# Patient Record
Sex: FEMALE | Race: BLACK OR AFRICAN AMERICAN | Employment: UNEMPLOYED | URBAN - METROPOLITAN AREA
[De-identification: names, ages, dates, MRNs, and addresses within clinical notes are randomized per-mention and may not be internally consistent; named-entity substitution may affect disease eponyms.]

---

## 2017-04-20 ENCOUNTER — HOSPITAL ENCOUNTER (OUTPATIENT)
Dept: MAMMOGRAPHY | Age: 62
Discharge: HOME | End: 2017-04-20
Attending: FAMILY MEDICINE
Payer: MEDICARE

## 2017-04-20 DIAGNOSIS — Z12.31 VISIT FOR SCREENING MAMMOGRAM: ICD-10-CM

## 2017-04-20 PROCEDURE — 77063 BREAST TOMOSYNTHESIS BI: CPT

## 2017-05-17 ENCOUNTER — HOSPITAL ENCOUNTER (OUTPATIENT)
Dept: MAMMOGRAPHY | Age: 62
Discharge: HOME | End: 2017-05-17
Attending: FAMILY MEDICINE
Payer: MEDICARE

## 2017-05-17 ENCOUNTER — HOSPITAL ENCOUNTER (OUTPATIENT)
Dept: ULTRASOUND IMAGING | Age: 62
Discharge: HOME | End: 2017-05-17
Attending: FAMILY MEDICINE
Payer: MEDICARE

## 2017-05-17 DIAGNOSIS — R92.8 ABNORMAL MAMMOGRAM: ICD-10-CM

## 2017-05-17 PROCEDURE — 76642 ULTRASOUND BREAST LIMITED: CPT

## 2017-05-17 PROCEDURE — 77065 DX MAMMO INCL CAD UNI: CPT

## 2017-12-27 ENCOUNTER — HOSPITAL ENCOUNTER (EMERGENCY)
Age: 62
Discharge: HOME | End: 2017-12-27
Attending: EMERGENCY MEDICINE
Payer: MEDICARE

## 2017-12-27 ENCOUNTER — APPOINTMENT (OUTPATIENT)
Dept: GENERAL RADIOLOGY | Age: 62
End: 2017-12-27
Attending: PHYSICIAN ASSISTANT
Payer: MEDICARE

## 2017-12-27 VITALS
SYSTOLIC BLOOD PRESSURE: 141 MMHG | HEART RATE: 49 BPM | DIASTOLIC BLOOD PRESSURE: 73 MMHG | RESPIRATION RATE: 16 BRPM | BODY MASS INDEX: 27.66 KG/M2 | HEIGHT: 64 IN | WEIGHT: 162 LBS | TEMPERATURE: 98.9 F | OXYGEN SATURATION: 99 %

## 2017-12-27 DIAGNOSIS — M25.561 ACUTE PAIN OF RIGHT KNEE: ICD-10-CM

## 2017-12-27 DIAGNOSIS — S82.831A CLOSED FRACTURE OF DISTAL END OF RIGHT FIBULA, UNSPECIFIED FRACTURE MORPHOLOGY, INITIAL ENCOUNTER: Primary | ICD-10-CM

## 2017-12-27 PROCEDURE — 73564 X-RAY EXAM KNEE 4 OR MORE: CPT

## 2017-12-27 PROCEDURE — 73610 X-RAY EXAM OF ANKLE: CPT

## 2017-12-27 PROCEDURE — 75810000053 HC SPLINT APPLICATION

## 2017-12-27 PROCEDURE — 99284 EMERGENCY DEPT VISIT MOD MDM: CPT

## 2017-12-27 PROCEDURE — 74011250637 HC RX REV CODE- 250/637: Performed by: PHYSICIAN ASSISTANT

## 2017-12-27 RX ORDER — HYDROCODONE BITARTRATE AND ACETAMINOPHEN 5; 325 MG/1; MG/1
1 TABLET ORAL
Qty: 20 TAB | Refills: 0 | Status: SHIPPED | OUTPATIENT
Start: 2017-12-27

## 2017-12-27 RX ORDER — HYDROCODONE BITARTRATE AND ACETAMINOPHEN 5; 325 MG/1; MG/1
1 TABLET ORAL
Status: COMPLETED | OUTPATIENT
Start: 2017-12-27 | End: 2017-12-27

## 2017-12-27 RX ORDER — DIPHENHYDRAMINE HCL 25 MG
25 CAPSULE ORAL
Status: COMPLETED | OUTPATIENT
Start: 2017-12-27 | End: 2017-12-27

## 2017-12-27 RX ORDER — DIPHENHYDRAMINE HCL 25 MG
25 CAPSULE ORAL
Qty: 20 CAP | Refills: 0 | Status: SHIPPED | OUTPATIENT
Start: 2017-12-27 | End: 2018-01-06

## 2017-12-27 RX ORDER — IBUPROFEN 600 MG/1
600 TABLET ORAL
Status: COMPLETED | OUTPATIENT
Start: 2017-12-27 | End: 2017-12-27

## 2017-12-27 RX ADMIN — DIPHENHYDRAMINE HYDROCHLORIDE 25 MG: 25 CAPSULE ORAL at 07:40

## 2017-12-27 RX ADMIN — HYDROCODONE BITARTRATE AND ACETAMINOPHEN 1 TABLET: 5; 325 TABLET ORAL at 07:40

## 2017-12-27 RX ADMIN — IBUPROFEN 600 MG: 600 TABLET ORAL at 07:19

## 2017-12-27 NOTE — ED PROVIDER NOTES
HPI Comments: Kenneth Montes is a 57 y.o. female that presents to the ED with a complaint of right knee and ankle pain after a mechanical fall, slipping down 3 steps yesterday. Pt states that she was able to ambulate with pain after the fall but statest hat pain became unbearable last night and was unable to sleep. She states that she did not take any OTC medications for her pain. She rates that pain as a 9/10 and throbbing. Patient is a 57 y.o. female presenting with ankle pain, knee pain, and fall.    Ankle Pain      Knee Pain      Fall          Past Medical History:   Diagnosis Date    Anemia NEC     Anxiety     Asthma     Cataract     Chronic pain     back    Depression     Diabetes ([address]     External hemorrhoid     Fibromyalgia     GERD (gastroesophageal reflux disease)     H/O sickle cell trait     Hypercholesterolemia     Hypertension     Osteoarthritis     hands, knees    Stenosis     spinal, age 50's       Past Surgical History:   Procedure Laterality Date    HX BACK SURGERY  12/28/2007    anterior cervical diskectomy with decompression of cervical canal, C4-5, and C5-6    HX BREAST BIOPSY Left 11/5/2014    LEFT BREAST BIOPSY WITH NEEDLE LOCALIZATION MAMMOGRAM TIMES TWO performed by Javy Conner MD at [address] [place] [address] HX BREAST REDUCTION  1992    HX CATARACT REMOVAL  2011    HX [facility]    HX HYSTERECTOMY  1991    fibroids    HX LUMBAR FUSION  2012         Family History:   Problem Relation Age of Onset   [address] [facility] [address] Arthritis-osteo Mother     Diabetes Mother     Stroke Mother     Hypertension Mother     Liver Disease Father     Liver Disease Sister        Social History     Social History    Marital status:      Spouse name: N/A    Number of children: N/A    Years of education: N/A     Occupational History    disablity, chronic back pain      Social History Main Topics    Smoking status: Current Some Day Smoker     Packs/day: 1.00     Years: 10.00 Types: Cigarettes    Smokeless tobacco: Never Used    Alcohol use 0.0 oz/week     0 Standard drinks or equivalent per week      Comment: socially    Drug use: No    Sexual activity: No     Other Topics Concern    Not on file     Social History Narrative         ALLERGIES: Dilaudid [hydromorphone (bulk)]; Sulfa (sulfonamide antibiotics); and Vicodin [hydrocodone-acetaminophen]    Review of Systems    Vitals:    12/27/17 0609   BP: 141/73   Pulse: (!) 49   Resp: 16   Temp: 98.9 °F (37.2 °C)   SpO2: 99%   Weight: 73.5 kg (162 lb)   Height: 5' 4\" (1.626 m)            Physical Exam   Constitutional: She is oriented to person, place, and time. She appears well-developed and well-nourished. No distress. HENT:   Head: Normocephalic and atraumatic. Nose: Nose normal.   Eyes: Conjunctivae are normal.   Neck: Normal range of motion. Cardiovascular: Regular rhythm and normal heart sounds. Bradycardia present. Pulmonary/Chest: Effort normal and breath sounds normal. No respiratory distress. She has no wheezes. Musculoskeletal:        Right knee: She exhibits decreased range of motion. She exhibits no swelling, no effusion, no ecchymosis, no deformity, no laceration, no erythema, normal alignment, no LCL laxity, no bony tenderness, normal meniscus and no MCL laxity. Tenderness found. Right ankle: She exhibits decreased range of motion, swelling and ecchymosis. Tenderness. Lateral malleolus, CF ligament and posterior TFL tenderness found. Feet:    Swelling noted to lateral malleolus  Ecchymosis noted to lateral malleolus and foot     Neurological: She is alert and oriented to person, place, and time. Psychiatric: She has a normal mood and affect.         MDM  Number of Diagnoses or Management Options  Acute pain of right knee:   Closed fracture of distal end of right fibula, unspecified fracture morphology, initial encounter:   Diagnosis management comments: XR Results (most recent):  Results from Hospital Encounter encounter on 12/27/17    XR KNEE RT MIN 4 V    Narrative  Right Knee Four Views    CPT CODE: 87298    HISTORY: Severe right knee pain and right ankle pain status post fall down 3  steps to a prior to arrival, progressively worsening. COMPARISON: None. FINDINGS:    Four views obtained. There is no evidence of fracture or dislocation. The joint  spaces are maintained. Mineralization is normal. Small joint effusion is  demonstrated. Impression  IMPRESSION:    Small joint effusion. Right Ankle Three Views     CPT CODE: [id number]     HISTORY: Severe right knee pain and right ankle pain status post fall down 3  steps to a prior to arrival, progressively worsening, fall, swelling .     COMPARISON: None.     FINDINGS:      Three views have been obtained. There is moderate lateral soft tissue swelling. There is minimal transverse offset of an oblique fracture through the distal  fibular diaphysis. The joint spaces are maintained. Mineralization is normal.   There is no radiopaque foreign body. Small calcaneal spurs noted.     IMPRESSION  IMPRESSION:     Minimally offset distal fibular fracture. Pt initially declines Opoid pain medications stating that she does not like to take medications. Discussed fracture with pt and she agrees to stronger pain medications and she will have her neighbor come pick her up as she drove here today. Pt is unsure of she has taken Norco before. Chart review shows allergy to Vicodin. Will treat with Benadryl if rash develops. NVI following application of splint. Will discharge home at this time.     Impression: distal tibia fracture    Plan: splint and crutches  discharge home  Pain control  Follow up with Ortho        Amount and/or Complexity of Data Reviewed  Tests in the radiology section of CPT®: ordered and reviewed    Risk of Complications, Morbidity, and/or Mortality  Presenting problems: moderate  Diagnostic procedures: moderate  Management options: moderate    Patient Progress  Patient progress: stable    ED Course       Procedures           Vitals:  Patient Vitals for the past 12 hrs:   Temp Pulse Resp BP SpO2   12/27/17 0609 98.9 °F (37.2 °C) (!) 49 16 141/73 99 %         Medications ordered:   Medications   ibuprofen (MOTRIN) tablet 600 mg (600 mg Oral Given 12/27/17 0719)   HYDROcodone-acetaminophen (NORCO) 5-325 mg per tablet 1 Tab (1 Tab Oral Given 12/27/17 0740)   diphenhydrAMINE (BENADRYL) capsule 25 mg (25 mg Oral Given 12/27/17 0740)         Lab findings:  No results found for this or any previous visit (from the past 12 hour(s)). X-Ray, CT or other radiology findings or impressions:  XR ANKLE RT MIN 3 V   Final Result      XR KNEE RT MIN 4 V   Final Result          Progress notes, Consult notes or additional Procedure notes:       Disposition:  Diagnosis:   1. Closed fracture of distal end of right fibula, unspecified fracture morphology, initial encounter        Disposition: discharge    Follow-up Information     None           Patient's Medications   Start Taking    DIPHENHYDRAMINE (BENADRYL) 25 MG CAPSULE    Take 1 Cap by mouth every six (6) hours as needed for up to 10 days. HYDROCODONE-ACETAMINOPHEN (NORCO) 5-325 MG PER TABLET    Take 1 Tab by mouth every four (4) hours as needed for Pain. Max Daily Amount: 6 Tabs. Continue Taking    ALBUTEROL (PROVENTIL HFA, VENTOLIN HFA, PROAIR HFA) 90 MCG/ACTUATION INHALER    Take 2 Puffs by inhalation every six (6) hours as needed. ALCOHOL SWABS (ALCOHOL PREP PADS) PADM    Use for daily blood sugar testing to clean skin. NOT FOR INGESTION. ALENDRONATE (FOSAMAX) 35 MG TABLET    TAKE 1 TABLET BY MOUTH EVERY 7 DAYS    AMLODIPINE (NORVASC) 10 MG TABLET    Take 1 Tab by mouth daily. ATENOLOL (TENORMIN) 100 MG TABLET    Take 1 Tab by mouth daily. ATORVASTATIN (LIPITOR) 20 MG TABLET    Take 1 Tab by mouth daily.     CARBAMIDE PEROXIDE (DEBROX) 6.5 % OTIC SOLUTION    Administer 5 Drops into each ear two (2) times a day. CHOLECALCIFEROL (VITAMIN D3) 1,000 UNIT TABLET    Take 1 Tab by mouth daily. CITALOPRAM (CELEXA) 20 MG TABLET    Take 1 Tab by mouth daily. CLONAZEPAM (KLONOPIN) 1 MG TABLET    Take 1 Tab by mouth daily as needed (anxiety). CYCLOBENZAPRINE (FLEXERIL) 10 MG TABLET    Take 1 Tab by mouth nightly. FLUCONAZOLE (DIFLUCAN) 150 MG TABLET    Take 150 mg by mouth daily. FLUTICASONE (FLONASE) 50 MCG/ACTUATION NASAL SPRAY    1 spray each nostril BID    GLIPIZIDE (GLUCOTROL) 10 MG TABLET    Take 1 tablet by mouth two (2) times a day. GLUCOSE BLOOD VI TEST STRIPS (BLOOD GLUCOSE TEST) STRIP    Use as directed to check blood sugar twice a day. HYDROCHLOROTHIAZIDE (HYDRODIURIL) 12.5 MG TABLET    Take 1 Tab by mouth daily. JANUVIA 100 MG TABLET    TAKE 1 TABLET BY MOUTH DAILY    LISINOPRIL (PRINIVIL, ZESTRIL) 10 MG TABLET    TAKE 1 TABLET BY MOUTH DAILY    LYRICA 100 MG CAPSULE    TAKE ONE CAPSULE BY MOUTH DAILY    MELOXICAM (MOBIC) 15 MG TABLET    Take 1 tablet by mouth daily. MULTIVITAMIN (ONE A DAY) TABLET    Take 1 tablet by mouth daily. NYSTATIN (MYCOSTATIN) POWDER    Apply  to affected area two (2) times a day. OMEPRAZOLE (PRILOSEC) 40 MG CAPSULE    Take 40 mg by mouth daily as needed. POTASSIUM CHLORIDE (K-DUR, KLOR-CON) 20 MEQ TABLET    Take 1 Tab by mouth two (2) times a day.    These Medications have changed    No medications on file   Stop Taking    No medications on file

## 2017-12-27 NOTE — ED NOTES
Huma Rosales placed short cast on patient lower leg. I have given crutches to the patient, adjusted them and provided complete instructions on safe use.

## 2017-12-27 NOTE — ED TRIAGE NOTES
Pt states she slipped coming down the stairs in her house, pt right knee was bent under her causing her ankle to extend out. Pt c/o 9/10 aching pain.

## 2017-12-29 ENCOUNTER — OFFICE VISIT (OUTPATIENT)
Dept: ORTHOPEDIC SURGERY | Facility: CLINIC | Age: 62
End: 2017-12-29

## 2017-12-29 VITALS
OXYGEN SATURATION: 95 % | SYSTOLIC BLOOD PRESSURE: 157 MMHG | TEMPERATURE: 99.1 F | HEIGHT: 64 IN | HEART RATE: 63 BPM | DIASTOLIC BLOOD PRESSURE: 91 MMHG | RESPIRATION RATE: 18 BRPM

## 2017-12-29 DIAGNOSIS — M25.571 ACUTE RIGHT ANKLE PAIN: ICD-10-CM

## 2017-12-29 DIAGNOSIS — S82.831A CLOSED FRACTURE OF DISTAL END OF RIGHT FIBULA, UNSPECIFIED FRACTURE MORPHOLOGY, INITIAL ENCOUNTER: Primary | ICD-10-CM

## 2017-12-29 PROBLEM — F33.9 RECURRENT DEPRESSION (HCC): Status: ACTIVE | Noted: 2017-12-29

## 2017-12-29 RX ORDER — OXYCODONE AND ACETAMINOPHEN 7.5; 325 MG/1; MG/1
1 TABLET ORAL
Qty: 25 TAB | Refills: 0 | Status: SHIPPED | OUTPATIENT
Start: 2017-12-29 | End: 2018-01-09 | Stop reason: SDUPTHER

## 2017-12-29 NOTE — PATIENT INSTRUCTIONS
Broken Ankle: Care Instructions  Your Care Instructions    An ankle may break (fracture) during sports, a fall, or other accidents. Fractures can range from a small, hairline crack, to a bone or bones broken into two or more pieces. Your treatment depends on how bad the break is. Your doctor may have put your ankle in a splint or cast to allow it to heal or to keep it stable until you see another doctor. It may take weeks or months for your ankle to heal. You can help your ankle heal with some care at home. You heal best when you take good care of yourself. Eat a variety of healthy foods, and don't smoke. You may have had a sedative to help you relax. You may be unsteady after having sedation. It can take a few hours for the medicine's effects to wear off. Common side effects of sedation include nausea, vomiting, and feeling sleepy or tired. The doctor has checked you carefully, but problems can develop later. If you notice any problems or new symptoms,  get medical treatment right away. Follow-up care is a key part of your treatment and safety. Be sure to make and go to all appointments, and call your doctor if you are having problems. It's also a good idea to know your test results and keep a list of the medicines you take. How can you care for yourself at home? · If the doctor gave you a sedative:  ¨ For 24 hours, don't do anything that requires attention to detail. It takes time for the medicine's effects to completely wear off. ¨ For your safety, do not drive or operate any machinery that could be dangerous. Wait until the medicine wears off and you can think clearly and react easily. · Put ice or a cold pack on your ankle for 10 to 20 minutes at a time. Try to do this every 1 to 2 hours for the next 3 days (when you are awake). Put a thin cloth between the ice and your cast or splint. Keep your cast or splint dry. · Follow the cast care instructions your doctor gives you.  If you have a splint, do not take it off unless your doctor tells you to. · Be safe with medicines. Take pain medicines exactly as directed. ¨ If the doctor gave you a prescription medicine for pain, take it as prescribed. ¨ If you are not taking a prescription pain medicine, ask your doctor if you can take an over-the-counter medicine. · Prop up your leg on pillows in the first few days after the injury. Keep the ankle higher than the level of your heart. This will help reduce swelling. · Do not put weight on your ankle unless your doctor tells you to. Use crutches to walk. · Follow instructions for exercises to keep your leg strong. · Wiggle your toes often to reduce swelling and stiffness. When should you call for help? Call 911 anytime you think you may need emergency care. For example, call if:  ? · You have chest pain, are short of breath, or you cough up blood. ? · You are very sleepy and you have trouble waking up. ?Call your doctor now or seek immediate medical care if:  ? · You have new or worse nausea or vomiting. ? · You have new or worse pain. ? · Your foot is cool or pale or changes color. ? · You have tingling, weakness, or numbness in your toes. ? · Your cast or splint feels too tight. ? · You have signs of a blood clot in your leg (called a deep vein thrombosis), such as:  ¨ Pain in your calf, back of the knee, thigh, or groin. ¨ Redness or swelling in your leg. ? Watch closely for changes in your health, and be sure to contact your doctor if:  ? · You have a problem with your splint or cast.   ? · You do not get better as expected. Where can you learn more? Go to http://elisa-lydia.info/. Enter P763 in the search box to learn more about \"Broken Ankle: Care Instructions. \"  Current as of: March 21, 2017  Content Version: 11.4  © 7559-5196 [employer].  Care instructions adapted under license by [employer] (which disclaims liability or warranty for this information). If you have questions about a medical condition or this instruction, always ask your healthcare professional. [address] any warranty or liability for your use of this information. Wearing a Cast: Care Instructions  Your Care Instructions  A cast protects a broken bone or other injury. Most casts are made of fiberglass, but plaster casts are still sometimes used. Once a cast is on, you can't remove it yourself. Your doctor will take it off. Follow-up care is a key part of your treatment and safety. Be sure to make and go to all appointments, and call your doctor if you are having problems. It's also a good idea to know your test results and keep a list of the medicines you take. How can you care for yourself at home? General care  · Follow your doctor's instructions for when you can first put weight on the cast. Fiberglass casts dry quickly and are soon ready to bear weight. But plaster casts may take several days before they are hard enough to use. When it's okay to put weight on your cast, do not stand or walk on it unless it is designed for walking. · Prop up the injured arm or leg on a pillow anytime you sit or lie down during the next 3 days. Try to keep it above the level of your heart. This will help reduce swelling. · If the fingers or toes on the limb with the cast were not injured, wiggle them every now and then. This helps move the blood and fluids in the injured limb. · Be safe with medicines. Read and follow all instructions on the label. ¨ If the doctor gave you a prescription medicine for pain, take it as prescribed. ¨ If you are not taking a prescription pain medicine, ask your doctor if you can take an over-the-counter medicine. · Keep up your muscle strength and tone as much as you can while protecting your injured limb or joint.  Your doctor may want you to tense and relax the muscles protected by the cast. Check with your doctor or your physical or occupational therapist for instructions. Water and your cast  · Keep your cast dry. · Tape a sheet of plastic to cover your cast when you take a shower or bath or when you have any other contact with water. Moisture can collect under the cast and cause skin irritation and itching. It can make infection more likely if you have had surgery or have a wound under the cast.  Cast and skin care  · Try blowing cool air from a hair dryer or fan into the cast to help relieve itching. Never stick items under your cast to scratch the skin. · Don't use oils or lotions near your cast. If the skin gets red or irritated around the edge of the cast, you may pad the edges with a soft material or use tape to cover them. When should you call for help? Call your doctor now or seek immediate medical care if:  ? · You have increased or severe pain. ? · You feel a warm or painful spot under the cast.   ? · You have problems with your cast. For example:  ¨ The skin under the cast burns or stings. ¨ The cast feels too tight. ¨ There is a lot of swelling near the cast. (Some swelling is normal.)  ¨ You have a new fever. ¨ There is drainage or a bad smell coming from the cast.   ? · Your foot or hand is cool or pale or changes color. ? · You have trouble moving your fingers or toes. ? · You have symptoms of a blood clot in your arm or leg (called a deep vein thrombosis). These may include:  ¨ Pain in the arm, calf, back of the knee, thigh, or groin. ¨ Redness and swelling in the arm, leg, or groin. ? Watch closely for changes in your health, and be sure to contact your doctor if:  ? · The cast is breaking apart. ? · You are not getting better as expected. Where can you learn more? Go to http://elisa-lydia.info/. Enter 454 [id number] in the search box to learn more about \"Wearing a Cast: Care Instructions. \"  Current as of: March 21, 2017  Content Version: 11.4  © 9043-4512 [employer]. Care instructions adapted under license by [employer] (which disclaims liability or warranty for this information). If you have questions about a medical condition or this instruction, always ask your healthcare professional. [address] any warranty or liability for your use of this information.

## 2017-12-29 NOTE — MR AVS SNAPSHOT
Visit Information Date & Time Provider Department Dept. Phone Encounter #  
 12/29/2017  2:15 PM Lupe Byrne, 25 [facility] - [facility] [id number] Follow-up Instructions Return in about 1 week (around 1/5/2018). Follow-up and Disposition History Upcoming Health Maintenance Date Due Pneumococcal 19-64 Medium Risk (1 of 1 - PPSV23) 9/26/1972 ZOSTER VACCINE AGE 60> 7/26/2013 EYE EXAM RETINAL OR DILATED Q1 7/14/2016 HEMOGLOBIN A1C Q6M 10/18/2016 MICROALBUMIN Q1 4/18/2017 LIPID PANEL Q1 4/18/2017 FOOT EXAM Q1 5/5/2017 BREAST CANCER SCRN MAMMOGRAM 5/17/2018 COLONOSCOPY 4/16/2024 DTaP/Tdap/Td series (2 - Td) 11/12/2024 Allergies as of 12/29/2017  Review Complete On: 12/29/2017 By: Lupe Byrne MD  
  
 Severity Noted Reaction Type Reactions Dilaudid [Hydromorphone (Bulk)]  11/12/2014    Rash, Nausea and Vomiting  
 Sulfa (Sulfonamide Antibiotics)  11/05/2014    Rash Vicodin [Hydrocodone-acetaminophen]  08/07/2013    Rash Other reaction(s): unknown Current Immunizations  Never Reviewed Name Date Pneumococcal Conjugate (PCV-13) 5/5/2016 Tdap 11/12/2014 Not reviewed this visit You Were Diagnosed With   
  
 Codes Comments Closed fracture of distal end of right fibula, unspecified fracture morphology, initial encounter    -  Primary ICD-10-CM: E17.387H ICD-9-CM: 824.8 Acute right ankle pain     ICD-10-CM: M25.571 ICD-9-CM: 719.47, 338.19 Vitals BP Pulse Temp Resp Height(growth percentile) SpO2  
 (!) 157/91 63 99.1 °F (37.3 °C) (Oral) 18 5' 4\" (1.626 m) 95% OB Status Smoking Status Hysterectomy Current Some Day Smoker Preferred Pharmacy Pharmacy Name Phone [facility] [address] [phone] Your Updated Medication List  
  
   
 This list is accurate as of: 12/29/17  4:47 PM.  Always use your most recent med list.  
  
  
  
  
 albuterol 90 mcg/actuation inhaler Commonly known as:  PROVENTIL HFA, VENTOLIN HFA, PROAIR HFA Take 2 Puffs by inhalation every six (6) hours as needed. alcohol swabs Padm Commonly known as:  ALCOHOL PREP PADS Use for daily blood sugar testing to clean skin. NOT FOR INGESTION. alendronate 35 mg tablet Commonly known as:  FOSAMAX TAKE 1 TABLET BY MOUTH EVERY 7 DAYS  
  
 amLODIPine 10 mg tablet Commonly known as:  Voncile [place] Take 1 Tab by mouth daily. atenolol 100 mg tablet Commonly known as:  TENORMIN Take 1 Tab by mouth daily. atorvastatin 20 mg tablet Commonly known as:  LIPITOR Take 1 Tab by mouth daily. carbamide peroxide 6.5 % otic solution Commonly known as:  Radha Horse Administer 5 Drops into each ear two (2) times a day. cholecalciferol 1,000 unit tablet Commonly known as:  VITAMIN D3 Take 1 Tab by mouth daily. citalopram 20 mg tablet Commonly known as:  Janny Bongo Take 1 Tab by mouth daily. clonazePAM 1 mg tablet Commonly known as:  [place] Take 1 Tab by mouth daily as needed (anxiety). cyclobenzaprine 10 mg tablet Commonly known as:  FLEXERIL Take 1 Tab by mouth nightly. diphenhydrAMINE 25 mg capsule Commonly known as:  BENADRYL Take 1 Cap by mouth every six (6) hours as needed for up to 10 days. fluconazole 150 mg tablet Commonly known as:  DIFLUCAN Take 150 mg by mouth daily. fluticasone 50 mcg/actuation nasal spray Commonly known as:  FLONASE  
1 spray each nostril BID  
  
 glipiZIDE 10 mg tablet Commonly known as:  Carly Garb Take 1 tablet by mouth two (2) times a day. glucose blood VI test strips strip Commonly known as:  blood glucose test  
Use as directed to check blood sugar twice a day. hydroCHLOROthiazide 12.5 mg tablet Commonly known as:  HYDRODIURIL  
 Take 1 Tab by mouth daily. HYDROcodone-acetaminophen 5-325 mg per tablet Commonly known as:  Sydney Kind Take 1 Tab by mouth every four (4) hours as needed for Pain. Max Daily Amount: 6 Tabs. JANUVIA 100 mg tablet Generic drug:  SITagliptin TAKE 1 TABLET BY MOUTH DAILY  
  
 lisinopril 10 mg tablet Commonly known as:  PRINIVIL, ZESTRIL  
TAKE 1 TABLET BY MOUTH DAILY  
  
 LYRICA 100 mg capsule Generic drug:  pregabalin TAKE ONE CAPSULE BY MOUTH DAILY  
  
 meloxicam 15 mg tablet Commonly known as:  MOBIC Take 1 tablet by mouth daily. multivitamin tablet Commonly known as:  ONE A DAY Take 1 tablet by mouth daily. nystatin powder Commonly known as:  MYCOSTATIN Apply  to affected area two (2) times a day. oxyCODONE-acetaminophen 7.5-325 mg per tablet Commonly known as:  PERCOCET Take 1 Tab by mouth every six (6) hours as needed for Pain. Max Daily Amount: 4 Tabs. potassium chloride 20 mEq tablet Commonly known as:  K-DUR, KLOR-CON Take 1 Tab by mouth two (2) times a day. PriLOSEC 40 mg capsule Generic drug:  omeprazole Take 40 mg by mouth daily as needed. Prescriptions Printed Refills  
 oxyCODONE-acetaminophen (PERCOCET) 7.5-325 mg per tablet 0 Sig: Take 1 Tab by mouth every six (6) hours as needed for Pain. Max Daily Amount: 4 Tabs. Class: Print Route: Oral  
  
We Performed the Following AMB POC XRAY, ANKLE; COMPLETE, 3+ VIE [08937 CPT(R)] CAST SUP SHRT LEG FIBERGLASS [id number] [facility]] POST OP SHOE [XAH538 Custom] Comments:  
 RIGHT POST OP SHOE  
 OK CLOSED RX DIST FIBULA FX [id number] CPT(R)] [address] Comments:  
 Ambulation with walker non weightbearing Follow-up Instructions Return in about 1 week (around 1/5/2018). Referral Information Referral ID Referred By Referred To  
  
 [id number] Michaelle Gilford C Not Available Visits Status Start Date End Date 1 New Request 12/29/17 12/29/18 If your referral has a status of pending review or denied, additional information will be sent to support the outcome of this decision. Patient Instructions Broken Ankle: Care Instructions Your Care Instructions An ankle may break (fracture) during sports, a fall, or other accidents. Fractures can range from a small, hairline crack, to a bone or bones broken into two or more pieces. Your treatment depends on how bad the break is. Your doctor may have put your ankle in a splint or cast to allow it to heal or to keep it stable until you see another doctor. It may take weeks or months for your ankle to heal. You can help your ankle heal with some care at home. You heal best when you take good care of yourself. Eat a variety of healthy foods, and don't smoke. You may have had a sedative to help you relax. You may be unsteady after having sedation. It can take a few hours for the medicine's effects to wear off. Common side effects of sedation include nausea, vomiting, and feeling sleepy or tired. The doctor has checked you carefully, but problems can develop later. If you notice any problems or new symptoms,  get medical treatment right away. Follow-up care is a key part of your treatment and safety. Be sure to make and go to all appointments, and call your doctor if you are having problems. It's also a good idea to know your test results and keep a list of the medicines you take. How can you care for yourself at home? · If the doctor gave you a sedative: ¨ For 24 hours, don't do anything that requires attention to detail. It takes time for the medicine's effects to completely wear off. ¨ For your safety, do not drive or operate any machinery that could be dangerous. Wait until the medicine wears off and you can think clearly and react easily. · Put ice or a cold pack on your ankle for 10 to 20 minutes at a time.  Try to do this every 1 to 2 hours for the next 3 days (when you are awake). Put a thin cloth between the ice and your cast or splint. Keep your cast or splint dry. · Follow the cast care instructions your doctor gives you. If you have a splint, do not take it off unless your doctor tells you to. · Be safe with medicines. Take pain medicines exactly as directed. ¨ If the doctor gave you a prescription medicine for pain, take it as prescribed. ¨ If you are not taking a prescription pain medicine, ask your doctor if you can take an over-the-counter medicine. · Prop up your leg on pillows in the first few days after the injury. Keep the ankle higher than the level of your heart. This will help reduce swelling. · Do not put weight on your ankle unless your doctor tells you to. Use crutches to walk. · Follow instructions for exercises to keep your leg strong. · Wiggle your toes often to reduce swelling and stiffness. When should you call for help? Call 911 anytime you think you may need emergency care. For example, call if: 
? · You have chest pain, are short of breath, or you cough up blood. ? · You are very sleepy and you have trouble waking up. ?Call your doctor now or seek immediate medical care if: 
? · You have new or worse nausea or vomiting. ? · You have new or worse pain. ? · Your foot is cool or pale or changes color. ? · You have tingling, weakness, or numbness in your toes. ? · Your cast or splint feels too tight. ? · You have signs of a blood clot in your leg (called a deep vein thrombosis), such as: 
¨ Pain in your calf, back of the knee, thigh, or groin. ¨ Redness or swelling in your leg. ? Watch closely for changes in your health, and be sure to contact your doctor if: 
? · You have a problem with your splint or cast.  
? · You do not get better as expected. Where can you learn more? Go to http://elisa-lydia.info/. Enter P763 in the search box to learn more about \"Broken Ankle: Care Instructions. \" Current as of: March 21, 2017 Content Version: 11.4 © 3497-0667 [employer]. Care instructions adapted under license by [employer] (which disclaims liability or warranty for this information). If you have questions about a medical condition or this instruction, always ask your healthcare professional. [address] any warranty or liability for your use of this information. Wearing a Cast: Care Instructions Your Care Instructions A cast protects a broken bone or other injury. Most casts are made of fiberglass, but plaster casts are still sometimes used. Once a cast is on, you can't remove it yourself. Your doctor will take it off. Follow-up care is a key part of your treatment and safety. Be sure to make and go to all appointments, and call your doctor if you are having problems. It's also a good idea to know your test results and keep a list of the medicines you take. How can you care for yourself at home? General care · Follow your doctor's instructions for when you can first put weight on the cast. Fiberglass casts dry quickly and are soon ready to bear weight. But plaster casts may take several days before they are hard enough to use. When it's okay to put weight on your cast, do not stand or walk on it unless it is designed for walking. · Prop up the injured arm or leg on a pillow anytime you sit or lie down during the next 3 days. Try to keep it above the level of your heart. This will help reduce swelling. · If the fingers or toes on the limb with the cast were not injured, wiggle them every now and then. This helps move the blood and fluids in the injured limb. · Be safe with medicines. Read and follow all instructions on the label. ¨ If the doctor gave you a prescription medicine for pain, take it as prescribed. ¨ If you are not taking a prescription pain medicine, ask your doctor if you can take an over-the-counter medicine. · Keep up your muscle strength and tone as much as you can while protecting your injured limb or joint. Your doctor may want you to tense and relax the muscles protected by the cast. Check with your doctor or your physical or occupational therapist for instructions. Water and your cast 
· Keep your cast dry. · Tape a sheet of plastic to cover your cast when you take a shower or bath or when you have any other contact with water. Moisture can collect under the cast and cause skin irritation and itching. It can make infection more likely if you have had surgery or have a wound under the cast. 
Cast and skin care · Try blowing cool air from a hair dryer or fan into the cast to help relieve itching. Never stick items under your cast to scratch the skin. · Don't use oils or lotions near your cast. If the skin gets red or irritated around the edge of the cast, you may pad the edges with a soft material or use tape to cover them. When should you call for help? Call your doctor now or seek immediate medical care if: 
? · You have increased or severe pain. ? · You feel a warm or painful spot under the cast.  
? · You have problems with your cast. For example: ¨ The skin under the cast burns or stings. ¨ The cast feels too tight. ¨ There is a lot of swelling near the cast. (Some swelling is normal.) ¨ You have a new fever. ¨ There is drainage or a bad smell coming from the cast.  
? · Your foot or hand is cool or pale or changes color. ? · You have trouble moving your fingers or toes. ? · You have symptoms of a blood clot in your arm or leg (called a deep vein thrombosis). These may include: 
¨ Pain in the arm, calf, back of the knee, thigh, or groin. ¨ Redness and swelling in the arm, leg, or groin. ? Watch closely for changes in your health, and be sure to contact your doctor if: ? · The cast is breaking apart. ? · You are not getting better as expected. Where can you learn more? Go to http://elisa-lydia.info/. Enter 454 [id number] in the search box to learn more about \"Wearing a Cast: Care Instructions. \" Current as of: March 21, 2017 Content Version: 11.4 © 3249-0340 [employer]. Care instructions adapted under license by [employer] (which disclaims liability or warranty for this information). If you have questions about a medical condition or this instruction, always ask your healthcare professional. [address] any warranty or liability for your use of this information. Patient Instructions History Introducing \[facility]\"" & HEALTH SERVICES! Nba Espinoza introduces [employer] patient portal. Now you can access parts of your medical record, email your doctor's office, and request medication refills online. 1. In your internet browser, go to [email or web address] Textbook Time. [email or web address] Textbook Time 2. Click on the First Time User? Click Here link in the Sign In box. You will see the New Member Sign Up page. 3. Enter your [employer] Access Code exactly as it appears below. You will not need to use this code after youve completed the sign-up process. If you do not sign up before the expiration date, you must request a new code. · [employer] Access Code: [email or web address] Expires: 3/27/2018  8:21 AM 
 
4. Enter the last four digits of your Social Security Number (xxxx) and Date of Birth (mm/dd/yyyy) as indicated and click Submit. You will be taken to the next sign-up page. 5. Create a CloudBiltt ID. This will be your [employer] login ID and cannot be changed, so think of one that is secure and easy to remember. 6. Create a [employer] password. You can change your password at any time. 7. Enter your Password Reset Question and Answer. This can be used at a later time if you forget your password. 8. Enter your e-mail address. You will receive e-mail notification when new information is available in [address] 9. Click Sign Up. You can now view and download portions of your medical record. 10. Click the Download Summary menu link to download a portable copy of your medical information. If you have questions, please visit the Frequently Asked Questions section of the motify website. Remember, motify is NOT to be used for urgent needs. For medical emergencies, dial 911. Now available from your iPhone and Android! Please provide this summary of care documentation to your next provider. Your primary care clinician is listed as Don Osborn. If you have any questions after today's visit, please call [phone].

## 2017-12-29 NOTE — PROGRESS NOTES
Patient: Dagoberto Moses                MRN: [id number]       SSN: [id number]  YOB: 1955        AGE: 57 y.o. SEX: female    PCP: Cee Barrios MD  12/29/17    Chief Complaint   Patient presents with    Leg Pain     Right     HISTORY:  Dagoberto Moses is a 57 y.o. female who is seen for a right ankle injury. She reports she slipped on the third step while walking down stairs in her bedroom slippers and fell down twisted her right leg. She was seen at [facility] ED on 12/27/17 where x-rays revealed a minimally displaced distal fibula fracture. She is adamantly opposed to surgery. She states that she has not been able to walk on her ankle since the injury. She was previously seen for bilateral knee and left great toe pain. She was treated by Dr. Anuel Knott at the spine center for her low back pain. She is s/p lumbar fusion by Dr. Malgorzata Blackwood. Pain Assessment  12/29/2017   Location of Pain Leg   Location Modifiers Right   Severity of Pain 8   Quality of Pain Throbbing;Aching   Duration of Pain Persistent   Frequency of Pain Constant   Aggravating Factors Bending;Walking;Standing   Limiting Behavior Yes   Relieving Factors Elevation; Rest   Result of Injury Yes   Work-Related Injury No   Type of Injury Fall     Occupation, etc:  Ms. Omaira Griffin receives social security disability benefits for lumbar stenosis and fibromyalgia. 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 She states she is very active in the community and various community organizations. She lives in [place] with her granddaughter who comes and goes. She has a 23 month grandson that she has joint custody with the father. Current weight is 162 pounds. She is 5'4\" tall. She is a diet controlled diabetic and hypertensive.      Lab Results   Component Value Date/Time    Hemoglobin A1c 6.7 04/18/2016 09:34 AM    Hemoglobin A1c (POC) 7.5 08/27/2015 12:14 PM     Weight Metrics 12/27/2017 5/5/2016 3/14/2016 2/29/2016 1/4/2016 12/1/2015 11/12/2015   Weight 162 lb 176 lb 181 lb 3.2 oz 178 lb 174 lb 176 lb 172 lb   BMI 27.81 kg/m2 30.2 kg/m2 31.09 kg/m2 30.54 kg/m2 29.85 kg/m2 30.2 kg/m2 29.51 kg/m2       Patient Active Problem List   Diagnosis Code    Chronic back pain M54.9, G89.29    Chronic neck pain M54.2, G89.29    Stenosis     Depression F32.9    Osteopenia M85.80    Dehydration E86.0    Hypokalemia E87.6    Hypomagnesemia E83.42    Vitamin D deficiency E55.9    Essential hypertension I10    Well controlled type 2 diabetes mellitus (HCC) E11.9    Recurrent depression ([facility] [address] F33.9     REVIEW OF SYSTEMS: All Below are Negative except: See HPI   Constitutional: negative for fever, chills, and weight loss. Cardiovascular: negative for chest pain, claudication, leg swelling, SOB, OBANDO   Gastrointestinal: Negative for pain, N/V/C/D, Blood in stool or urine, dysuria,  hematuria, incontinence, pelvic pain. Musculoskeletal: See HPI   Neurological: Negative for dizziness and weakness. Negative for headaches, Visual changes, confusion, seizures   Phychiatric/Behavioral: Negative for depression, memory loss, substance  abuse. Extremities: Negative for hair changes, rash, or skin lesion changes. Hematologic: Negative for bleeding problems, bruising, pallor or swollen lymph  nodes   Peripheral Vascular: No calf pain, no circulation deficits.     Social History     Social History    Marital status:      Spouse name: N/A    Number of children: N/A    Years of education: N/A     Occupational History    disablity, chronic back pain      Social History Main Topics    Smoking status: Current Some Day Smoker     Packs/day: 1.00     Years: 10.00     Types: Cigarettes    Smokeless tobacco: Never Used    Alcohol use 0.0 oz/week     0 Standard drinks or equivalent per week      Comment: socially    Drug use: No    Sexual activity: No     Other Topics Concern    Not on file     Social History Narrative      Allergies   Allergen Reactions    Dilaudid [Hydromorphone (Bulk)] Rash and Nausea and Vomiting    Sulfa (Sulfonamide Antibiotics) Rash    Vicodin [Hydrocodone-Acetaminophen] Rash     Other reaction(s): unknown      Current Outpatient Prescriptions   Medication Sig    LYRICA 100 mg capsule TAKE ONE CAPSULE BY MOUTH DAILY    glucose blood VI test strips (BLOOD GLUCOSE TEST) strip Use as directed to check blood sugar twice a day.  atorvastatin (LIPITOR) 20 mg tablet Take 1 Tab by mouth daily.  citalopram (CELEXA) 20 mg tablet Take 1 Tab by mouth daily.  amLODIPine (NORVASC) 10 mg tablet Take 1 Tab by mouth daily.  lisinopril (PRINIVIL, ZESTRIL) 10 mg tablet TAKE 1 TABLET BY MOUTH DAILY    fluconazole (DIFLUCAN) 150 mg tablet Take 150 mg by mouth daily.  cholecalciferol (VITAMIN D3) 1,000 unit tablet Take 1 Tab by mouth daily.  nystatin (MYCOSTATIN) powder Apply  to affected area two (2) times a day.  Hydrochlorothiazide (HYDRODIURIL) 12.5 mg tablet Take 1 Tab by mouth daily.  atenolol (TENORMIN) 100 mg tablet Take 1 Tab by mouth daily.  alcohol swabs (ALCOHOL PREP PADS) padm Use for daily blood sugar testing to clean skin. NOT FOR INGESTION.  albuterol (PROVENTIL HFA, VENTOLIN HFA, PROAIR HFA) 90 mcg/actuation inhaler Take 2 Puffs by inhalation every six (6) hours as needed.  clonazePAM (KLONOPIN) 1 mg tablet Take 1 Tab by mouth daily as needed (anxiety).  meloxicam (MOBIC) 15 mg tablet Take 1 tablet by mouth daily.  multivitamin (ONE A DAY) tablet Take 1 tablet by mouth daily.  glipiZIDE (GLUCOTROL) 10 mg tablet Take 1 tablet by mouth two (2) times a day.  omeprazole (PRILOSEC) 40 mg capsule Take 40 mg by mouth daily as needed.  fluticasone (FLONASE) 50 mcg/actuation nasal spray 1 spray each nostril BID    potassium chloride (K-DUR, KLOR-CON) 20 mEq tablet Take 1 Tab by mouth two (2) times a day.     HYDROcodone-acetaminophen (Lenon Gauze) 5-325 mg per tablet Take 1 Tab by mouth every four (4) hours as needed for Pain. Max Daily Amount: 6 Tabs.  diphenhydrAMINE (BENADRYL) 25 mg capsule Take 1 Cap by mouth every six (6) hours as needed for up to 10 days.  JANUVIA 100 mg tablet TAKE 1 TABLET BY MOUTH DAILY    alendronate (FOSAMAX) 35 mg tablet TAKE 1 TABLET BY MOUTH EVERY 7 DAYS    carbamide peroxide (DEBROX) 6.5 % otic solution Administer 5 Drops into each ear two (2) times a day.  cyclobenzaprine (FLEXERIL) 10 mg tablet Take 1 Tab by mouth nightly. No current facility-administered medications for this visit. PHYSICAL EXAMINATION:  Visit Vitals    BP (!) 157/91    Pulse 63    Temp 99.1 °F (37.3 °C) (Oral)    Resp 18    Ht 5' 4\" (1.626 m)    SpO2 95%      ORTHO EXAMINATION:  Examination Right Ankle/Foot Left Ankle/Foot   Skin Impending fracture blister over the anteromedial aspect and lateral aspect Intact   Swelling +++ medial and lateral swelling -   Dorsiflexion 0 10   Plantarflexion 20 25   Deformity - -   Inversion laxity - -   Anterior drawer - -   Medial tenderness  -   Lateral tenderness + -   Heel cord Intact Intact   Sensation Intact Intact   Bunion - -   Toe nails Normal Normal   Capillary refill Normal Normal   Ambulating using a walker today  Purplish discoloration of the toes. 4+ pre-tibial edema   Home Care Face to Face Encounter    Patients Name: Rich Monique    YOB: 1955    Primary Diagnosis:   Chief Complaint   Patient presents with    Leg Pain     Right       Date of Face to Face:   12/29/17                                Face to Face Encounter findings are related to primary reason for home care:   yes    1. I certify that the patient needs intermittent care as follows: skilled nursing care    2.  I certify that this patient is homebound, that is: 1) patient requires the use of a cane/crutches/walker/wheelchair device, special transportation, or assistance of another to leave the home; or 2) patient's condition makes leaving the home medically contraindicated; and 3) patient has a normal inability to leave the home and leaving the home requires considerable and taxing effort. Patient may leave the home for infrequent and short duration for medical reasons, and occasional absences for non-medical reasons. Homebound status is due to the following functional limitations: Patient's ambulation limited secondary to severe pain and requires the use of an assistive device and the assistance of a caregiver for safe completion. Patient with strength and ROM deficits limiting ambulation endurance requiring the use of an assistive device and the assistance of a caregiver. Patient deemed temporarily homebound secondary to increased risk for infection when leaving home and going out into the community. 3. I certify that this patient is under my care and that I, or a nurse practitioner or  [id number], or clinical nurse specialist, or certified nurse midwife, working with me, had a Face-to-Face Encounter that meets the physician Face-to-Face Encounter requirements. The following are the clinical findings from the [address] [place] [address] encounter that support the need for skilled services and is a summary of the encounter:      ICD-10-CM ICD-9-CM    1. Closed fracture of distal end of right fibula, unspecified fracture morphology, initial encounter S82.831A 824.8 CAST SUP SHRT LEG FIBERGLASS      POST OP SHOE      REFERRAL TO HOME HEALTH      ME CLOSED RX DIST FIBULA FX   2. Acute right ankle pain M25.571 719.47 REFERRAL TO HOME HEALTH     338.19        See attached progress note. Hans Rincon  12/29/17    THE FOLLOWING TO BE COMPLETED BY THE COMMUNITY PHYSICIAN:    I concur with the findings described above from the F2F encounter that this patient is homebound and in need of a skilled service.     Printed Certifying Physician Name: Hans Rincon    Date: 12/29/17    RADIOGRAPHS:  XR RIGHT ANKLE POST REDUCTION 12/29/17  IMPRESSION:  Improved position s/p closed reduction and SLC application    XR RIGHT ANKLE 12/27/17  IMPRESSION:  Minimally offset distal fibular fracture.  -I have independently reviewed these images during this office visit. -Dr. Demarco Neither  Mild medial joint space narrowing    XR RIGHT KNEE 12/27/17  IMPRESSION:   Small joint effusion.   -I have independently reviewed these images during this office visit. -Dr. Rios Oh:      ICD-10-CM ICD-9-CM    1. Closed fracture of distal end of right fibula, unspecified fracture morphology, initial encounter S82.831A 824.8 CAST SUP SHRT LEG FIBERGLASS      POST OP SHOE      REFERRAL TO HOME HEALTH      NE CLOSED RX DIST FIBULA FX   2. Acute right ankle pain M25.571 719.47 REFERRAL TO HOME HEALTH     338.19      PLAN:  We discussed surgical and non-surgical options today. She is adamantly opposed to surgery. Molded short leg cast applied after closed manipulation. Post reduction x rays look good. She will follow up in 1 week. Home therapy order placed today for non weight bearing ambulation instructions with a walker. Percocet Rx provided.     Scribed by Tali Mathesw ([address]) as dictated by Juan Antonio Murguia MD

## 2017-12-30 ENCOUNTER — HOME HEALTH ADMISSION (OUTPATIENT)
Dept: HOME HEALTH SERVICES | Facility: HOME HEALTH | Age: 62
End: 2017-12-30
Payer: MEDICARE

## 2017-12-30 PROCEDURE — 3331090002 HH PPS REVENUE DEBIT

## 2017-12-30 PROCEDURE — 3331090001 HH PPS REVENUE CREDIT

## 2017-12-31 ENCOUNTER — HOME CARE VISIT (OUTPATIENT)
Dept: HOME HEALTH SERVICES | Facility: HOME HEALTH | Age: 62
End: 2017-12-31
Payer: MEDICARE

## 2017-12-31 ENCOUNTER — HOME CARE VISIT (OUTPATIENT)
Dept: SCHEDULING | Facility: HOME HEALTH | Age: 62
End: 2017-12-31
Payer: MEDICARE

## 2017-12-31 PROCEDURE — 3331090003 HH PPS REVENUE ADJ

## 2017-12-31 PROCEDURE — 3331090001 HH PPS REVENUE CREDIT

## 2017-12-31 PROCEDURE — 400013 HH SOC

## 2017-12-31 PROCEDURE — G0151 HHCP-SERV OF PT,EA 15 MIN: HCPCS

## 2017-12-31 PROCEDURE — 3331090002 HH PPS REVENUE DEBIT

## 2018-01-01 PROCEDURE — 3331090002 HH PPS REVENUE DEBIT

## 2018-01-01 PROCEDURE — 3331090001 HH PPS REVENUE CREDIT

## 2018-01-02 ENCOUNTER — HOME CARE VISIT (OUTPATIENT)
Dept: HOME HEALTH SERVICES | Facility: HOME HEALTH | Age: 63
End: 2018-01-02
Payer: MEDICARE

## 2018-01-02 ENCOUNTER — TELEPHONE (OUTPATIENT)
Dept: ORTHOPEDIC SURGERY | Age: 63
End: 2018-01-02

## 2018-01-02 VITALS
TEMPERATURE: 97.8 F | HEART RATE: 74 BPM | SYSTOLIC BLOOD PRESSURE: 110 MMHG | DIASTOLIC BLOOD PRESSURE: 80 MMHG | RESPIRATION RATE: 17 BRPM

## 2018-01-02 PROCEDURE — 3331090001 HH PPS REVENUE CREDIT

## 2018-01-02 PROCEDURE — 3331090002 HH PPS REVENUE DEBIT

## 2018-01-02 NOTE — TELEPHONE ENCOUNTER
[facility] Therapist was to see patient today for physical therapy. Fede Chatterjee has been unable to reach patient by phone. Fede Chatterjee did drive to patient home, but patient is not a home. Fede Chatterjee will try to call tomorrow to reach patient and come back to house.  Patient has only had the initial eval. Ms. Kayla Rabago can be reached at [phone]

## 2018-01-03 PROCEDURE — 3331090002 HH PPS REVENUE DEBIT

## 2018-01-03 PROCEDURE — 3331090001 HH PPS REVENUE CREDIT

## 2018-01-04 PROCEDURE — 3331090001 HH PPS REVENUE CREDIT

## 2018-01-04 PROCEDURE — 3331090002 HH PPS REVENUE DEBIT

## 2018-01-05 ENCOUNTER — HOME CARE VISIT (OUTPATIENT)
Dept: HOME HEALTH SERVICES | Facility: HOME HEALTH | Age: 63
End: 2018-01-05
Payer: MEDICARE

## 2018-01-05 PROCEDURE — 3331090001 HH PPS REVENUE CREDIT

## 2018-01-05 PROCEDURE — 3331090002 HH PPS REVENUE DEBIT

## 2018-01-06 PROCEDURE — 3331090001 HH PPS REVENUE CREDIT

## 2018-01-06 PROCEDURE — 3331090002 HH PPS REVENUE DEBIT

## 2018-01-07 PROCEDURE — 3331090001 HH PPS REVENUE CREDIT

## 2018-01-07 PROCEDURE — 3331090002 HH PPS REVENUE DEBIT

## 2018-01-08 PROCEDURE — 3331090001 HH PPS REVENUE CREDIT

## 2018-01-08 PROCEDURE — 3331090002 HH PPS REVENUE DEBIT

## 2018-01-09 ENCOUNTER — OFFICE VISIT (OUTPATIENT)
Dept: ORTHOPEDIC SURGERY | Facility: CLINIC | Age: 63
End: 2018-01-09

## 2018-01-09 VITALS
HEART RATE: 47 BPM | TEMPERATURE: 98.2 F | RESPIRATION RATE: 18 BRPM | DIASTOLIC BLOOD PRESSURE: 93 MMHG | HEIGHT: 64 IN | SYSTOLIC BLOOD PRESSURE: 156 MMHG | OXYGEN SATURATION: 99 %

## 2018-01-09 DIAGNOSIS — S82.821D CLOSED TORUS FRACTURE OF DISTAL END OF RIGHT FIBULA WITH ROUTINE HEALING, SUBSEQUENT ENCOUNTER: ICD-10-CM

## 2018-01-09 DIAGNOSIS — M25.571 ACUTE RIGHT ANKLE PAIN: ICD-10-CM

## 2018-01-09 DIAGNOSIS — S82.831A CLOSED FRACTURE OF DISTAL END OF RIGHT FIBULA, UNSPECIFIED FRACTURE MORPHOLOGY, INITIAL ENCOUNTER: ICD-10-CM

## 2018-01-09 DIAGNOSIS — M25.571 RIGHT ANKLE PAIN, UNSPECIFIED CHRONICITY: Primary | ICD-10-CM

## 2018-01-09 PROBLEM — E11.40 TYPE 2 DIABETES MELLITUS WITH DIABETIC NEUROPATHY (HCC): Status: ACTIVE | Noted: 2018-01-09

## 2018-01-09 PROCEDURE — 3331090001 HH PPS REVENUE CREDIT

## 2018-01-09 PROCEDURE — 3331090002 HH PPS REVENUE DEBIT

## 2018-01-09 RX ORDER — OXYCODONE AND ACETAMINOPHEN 7.5; 325 MG/1; MG/1
1 TABLET ORAL
Qty: 21 TAB | Refills: 0 | Status: SHIPPED | OUTPATIENT
Start: 2018-01-09 | End: 2018-01-24 | Stop reason: SDUPTHER

## 2018-01-09 NOTE — PROGRESS NOTES
HISTORY OF PRESENT ILLNESS:  Julia Lora presents to the office for skin check. She is status post, nonoperative distal, minimally-displaced, right fibula fracture. She is at home with a four-post rolling walker, and she is nonweightbearing of the right lower extremity. A cast was placed, which is found to be well-fitting, two weeks ago following her initial encounter with Dr. Rehan Dai. PHYSICAL EXAM:  The cast is removed today to reveal over the dorsum of the right proximal foot and the distal anterior right lower leg, three distinct areas of hyperpigmentation, which were associated with impending fracture blisters identified at her initial encounter. There is no evidence of cellulitis or skin breakdown associated today. The patient has soft tissue swelling circumferentially about the ankle. She has ecchymotic staining of the hindfoot in a U-fashion rounding the calcaneus. There is no evidence of skin breakdown or infection. She has point tenderness over the lateral malleolus. Her internal and external rotation is limited secondary to pain and only 1-2° respectively. Plantarflexion is noted barely at 5° dorsiflexion, actively. She has passive dorsiflexion of about 2° with pain reproduced lateral to the ankle. RADIOGRAPHS:  X-rays, three views of the right ankle, today, without the cast in place, reveals the mortise is acceptably balanced. There is a minimally displaced, right fibular fracture, which, when compared to prior imaging, reveals no interval change. PLAN:   A new well-fitting, well-padded, short leg cast is placed today. The patient is relocating to the [facility]. She is to follow with an orthopod within two weeks for cast removal, skin check, and re-x-ray of the ankle. X-rays were reviewed, and all her questions were answered to her satisfaction. Elevation was also instructed as a preventative to mitigate further skin concerns and associate with pain control as well.

## 2018-01-09 NOTE — MR AVS SNAPSHOT
Visit Information Date & Time Provider Department Dept. Phone Encounter #  
 1/9/2018 10:15 AM Carl Licha, [address] [facility] [place]  [phone] [id number] Upcoming Health Maintenance Date Due Pneumococcal 19-64 Medium Risk (1 of 1 - PPSV23) 9/26/1972 ZOSTER VACCINE AGE 60> 7/26/2013 EYE EXAM RETINAL OR DILATED Q1 7/14/2016 HEMOGLOBIN A1C Q6M 10/18/2016 MICROALBUMIN Q1 4/18/2017 LIPID PANEL Q1 4/18/2017 FOOT EXAM Q1 5/5/2017 BREAST CANCER SCRN MAMMOGRAM 5/17/2018 COLONOSCOPY 4/16/2024 DTaP/Tdap/Td series (2 - Td) 11/12/2024 Allergies as of 1/9/2018  Review Complete On: 1/9/2018 By: Scott Sandhu Severity Noted Reaction Type Reactions Dilaudid [Hydromorphone (Bulk)]  11/12/2014    Rash, Nausea and Vomiting  
 Sulfa (Sulfonamide Antibiotics)  11/05/2014    Rash Vicodin [Hydrocodone-acetaminophen]  08/07/2013    Rash Other reaction(s): unknown Current Immunizations  Never Reviewed Name Date Pneumococcal Conjugate (PCV-13) 5/5/2016 Tdap 11/12/2014 Not reviewed this visit You Were Diagnosed With   
  
 Codes Comments Right ankle pain, unspecified chronicity    -  Primary ICD-10-CM: M25.571 ICD-9-CM: 719.47 Closed torus fracture of distal end of right fibula with routine healing, subsequent encounter     ICD-10-CM: S82.821D ICD-9-CM: V54.16 Vitals BP Pulse Temp Resp Height(growth percentile) SpO2  
 (!) 156/93 (!) 47 98.2 °F (36.8 °C) (Oral) 18 5' 4\" (1.626 m) 99% OB Status Smoking Status Hysterectomy Current Some Day Smoker Preferred Pharmacy Pharmacy Name Phone [facility] [address] [place] [address] [address] [place] [address] [phone] Your Updated Medication List  
  
   
This list is accurate as of: 1/9/18 11:44 AM.  Always use your most recent med list.  
  
  
  
  
 albuterol 90 mcg/actuation inhaler Commonly known as:  PROVENTIL HFA, VENTOLIN HFA, PROAIR HFA Take 2 Puffs by inhalation every six (6) hours as needed. alcohol swabs Padm Commonly known as:  ALCOHOL PREP PADS Use for daily blood sugar testing to clean skin. NOT FOR INGESTION. alendronate 35 mg tablet Commonly known as:  FOSAMAX TAKE 1 TABLET BY MOUTH EVERY 7 DAYS  
  
 amLODIPine 10 mg tablet Commonly known as:  Misbah Tish Take 1 Tab by mouth daily. atenolol 100 mg tablet Commonly known as:  TENORMIN Take 1 Tab by mouth daily. atorvastatin 20 mg tablet Commonly known as:  LIPITOR Take 1 Tab by mouth daily. carbamide peroxide 6.5 % otic solution Commonly known as:  Trenton Oconnor Administer 5 Drops into each ear two (2) times a day. cholecalciferol 1,000 unit tablet Commonly known as:  VITAMIN D3 Take 1 Tab by mouth daily. citalopram 20 mg tablet Commonly known as:  Donovan Branden Take 1 Tab by mouth daily. clonazePAM 1 mg tablet Commonly known as:  Pamila Hippo Take 1 Tab by mouth daily as needed (anxiety). cyclobenzaprine 10 mg tablet Commonly known as:  FLEXERIL Take 1 Tab by mouth nightly. fluconazole 150 mg tablet Commonly known as:  DIFLUCAN Take 150 mg by mouth daily. fluticasone 50 mcg/actuation nasal spray Commonly known as:  FLONASE  
1 spray each nostril BID  
  
 glipiZIDE 10 mg tablet Commonly known as:  Providence Bee Take 1 tablet by mouth two (2) times a day. glucose blood VI test strips strip Commonly known as:  blood glucose test  
Use as directed to check blood sugar twice a day. hydroCHLOROthiazide 12.5 mg tablet Commonly known as:  HYDRODIURIL Take 1 Tab by mouth daily. HYDROcodone-acetaminophen 5-325 mg per tablet Commonly known as:  Jenny Rummage Take 1 Tab by mouth every four (4) hours as needed for Pain. Max Daily Amount: 6 Tabs. JANUVIA 100 mg tablet Generic drug:  SITagliptin TAKE 1 TABLET BY MOUTH DAILY  
  
 lisinopril 10 mg tablet Commonly known as:  PRINIVIL, ZESTRIL  
TAKE 1 TABLET BY MOUTH DAILY  
  
 LYRICA 100 mg capsule Generic drug:  pregabalin TAKE ONE CAPSULE BY MOUTH DAILY  
  
 meloxicam 15 mg tablet Commonly known as:  MOBIC Take 1 tablet by mouth daily. multivitamin tablet Commonly known as:  ONE A DAY Take 1 tablet by mouth daily. nystatin powder Commonly known as:  MYCOSTATIN Apply  to affected area two (2) times a day. oxyCODONE-acetaminophen 7.5-325 mg per tablet Commonly known as:  PERCOCET Take 1 Tab by mouth every six (6) hours as needed for Pain. Max Daily Amount: 4 Tabs. potassium chloride 20 mEq tablet Commonly known as:  K-DUR, KLOR-CON Take 1 Tab by mouth two (2) times a day. PriLOSEC 40 mg capsule Generic drug:  omeprazole Take 40 mg by mouth daily as needed (GERD). VITAMIN D2 50,000 unit capsule Generic drug:  ergocalciferol Take 50,000 Units by mouth every seven (7) days. We Performed the Following AMB POC XRAY, ANKLE; COMPLETE, 3+ VIE [48004 CPT(R)] Introducing [facility]! [employer] introduces [employer] patient portal. Now you can access parts of your medical record, email your doctor's office, and request medication refills online. 1. In your internet browser, go to [email or web address] 2. Click on the First Time User? Click Here link in the Sign In box. You will see the New Member Sign Up page. 3. Enter your [employer] Access Code exactly as it appears below. You will not need to use this code after youve completed the sign-up process. If you do not sign up before the expiration date, you must request a new code. · [employer] Access Code: [email or web address] Expires: 3/27/2018  8:21 AM 
 
4.  Enter the last four digits of your Social Security Number (xxxx) and Date of Birth (mm/dd/yyyy) as indicated and click Submit. You will be taken to the next sign-up page. 5. Create a [employer]im ID. This will be your [employer] login ID and cannot be changed, so think of one that is secure and easy to remember. 6. Create a [employer]im password. You can change your password at any time. 7. Enter your Password Reset Question and Answer. This can be used at a later time if you forget your password. 8. Enter your e-mail address. You will receive e-mail notification when new information is available in [address] 9. Click Sign Up. You can now view and download portions of your medical record. 10. Click the Download Summary menu link to download a portable copy of your medical information. If you have questions, please visit the Frequently Asked Questions section of the [employer] website. Remember, [employer] is NOT to be used for urgent needs. For medical emergencies, dial 911. Now available from your iPhone and Android! Please provide this summary of care documentation to your next provider. Your primary care clinician is listed as Daryle Khat. If you have any questions after today's visit, please call [phone].

## 2018-01-10 PROCEDURE — 3331090001 HH PPS REVENUE CREDIT

## 2018-01-10 PROCEDURE — 3331090002 HH PPS REVENUE DEBIT

## 2018-01-11 PROCEDURE — 3331090002 HH PPS REVENUE DEBIT

## 2018-01-11 PROCEDURE — 3331090001 HH PPS REVENUE CREDIT

## 2018-01-12 PROCEDURE — 3331090001 HH PPS REVENUE CREDIT

## 2018-01-12 PROCEDURE — 3331090002 HH PPS REVENUE DEBIT

## 2018-01-13 PROCEDURE — 3331090001 HH PPS REVENUE CREDIT

## 2018-01-13 PROCEDURE — 3331090002 HH PPS REVENUE DEBIT

## 2018-01-14 PROCEDURE — 3331090002 HH PPS REVENUE DEBIT

## 2018-01-14 PROCEDURE — 3331090001 HH PPS REVENUE CREDIT

## 2018-01-15 PROCEDURE — 3331090002 HH PPS REVENUE DEBIT

## 2018-01-15 PROCEDURE — 3331090001 HH PPS REVENUE CREDIT

## 2018-01-16 PROCEDURE — 3331090002 HH PPS REVENUE DEBIT

## 2018-01-16 PROCEDURE — 3331090001 HH PPS REVENUE CREDIT

## 2018-01-17 PROCEDURE — 3331090001 HH PPS REVENUE CREDIT

## 2018-01-17 PROCEDURE — 3331090002 HH PPS REVENUE DEBIT

## 2018-01-18 PROCEDURE — 3331090002 HH PPS REVENUE DEBIT

## 2018-01-18 PROCEDURE — 3331090001 HH PPS REVENUE CREDIT

## 2018-01-19 PROCEDURE — 3331090002 HH PPS REVENUE DEBIT

## 2018-01-19 PROCEDURE — 3331090001 HH PPS REVENUE CREDIT

## 2018-01-20 PROCEDURE — 3331090001 HH PPS REVENUE CREDIT

## 2018-01-20 PROCEDURE — 3331090002 HH PPS REVENUE DEBIT

## 2018-01-21 PROCEDURE — 3331090002 HH PPS REVENUE DEBIT

## 2018-01-21 PROCEDURE — 3331090001 HH PPS REVENUE CREDIT

## 2018-01-22 PROCEDURE — 3331090002 HH PPS REVENUE DEBIT

## 2018-01-22 PROCEDURE — 3331090001 HH PPS REVENUE CREDIT

## 2018-01-23 ENCOUNTER — HOME HEALTH ADMISSION (OUTPATIENT)
Dept: HOME HEALTH SERVICES | Facility: HOME HEALTH | Age: 63
End: 2018-01-23

## 2018-01-23 PROCEDURE — 3331090002 HH PPS REVENUE DEBIT

## 2018-01-23 PROCEDURE — 3331090003 HH PPS REVENUE ADJ

## 2018-01-23 PROCEDURE — 3331090001 HH PPS REVENUE CREDIT

## 2018-01-24 ENCOUNTER — OFFICE VISIT (OUTPATIENT)
Dept: ORTHOPEDIC SURGERY | Facility: CLINIC | Age: 63
End: 2018-01-24

## 2018-01-24 VITALS
SYSTOLIC BLOOD PRESSURE: 188 MMHG | HEART RATE: 54 BPM | DIASTOLIC BLOOD PRESSURE: 84 MMHG | TEMPERATURE: 98.6 F | RESPIRATION RATE: 18 BRPM | OXYGEN SATURATION: 97 % | HEIGHT: 64 IN

## 2018-01-24 DIAGNOSIS — S82.831A CLOSED FRACTURE OF DISTAL END OF RIGHT FIBULA, UNSPECIFIED FRACTURE MORPHOLOGY, INITIAL ENCOUNTER: ICD-10-CM

## 2018-01-24 DIAGNOSIS — M25.571 ACUTE RIGHT ANKLE PAIN: ICD-10-CM

## 2018-01-24 DIAGNOSIS — S82.821D CLOSED TORUS FRACTURE OF DISTAL END OF RIGHT FIBULA WITH ROUTINE HEALING, SUBSEQUENT ENCOUNTER: Primary | ICD-10-CM

## 2018-01-24 DIAGNOSIS — M25.571 RIGHT ANKLE PAIN, UNSPECIFIED CHRONICITY: ICD-10-CM

## 2018-01-24 RX ORDER — OXYCODONE AND ACETAMINOPHEN 7.5; 325 MG/1; MG/1
1 TABLET ORAL
Qty: 21 TAB | Refills: 0 | Status: SHIPPED | OUTPATIENT
Start: 2018-01-24

## 2018-01-24 NOTE — MR AVS SNAPSHOT
[address] [place] [address], [address] [place] [address] 
[phone] Patient: Cecily Olivarez MRN: [id number] Visit Information Date & Time Provider Department Dept. Phone Encounter #  
 2018  1:00 PM Yudi Martínez, [address] [facility] [id number] [phone] Follow-up Instructions Return in about 3 weeks (around 2018). Upcoming Health Maintenance Date Due Pneumococcal 19-64 Medium Risk (1 of 1 - PPSV23) 1972 ZOSTER VACCINE AGE 60> 2013 EYE EXAM RETINAL OR DILATED Q1 2016 HEMOGLOBIN A1C Q6M 10/18/2016 MICROALBUMIN Q1 2017 LIPID PANEL Q1 2017 FOOT EXAM Q1 2017 BREAST CANCER SCRN MAMMOGRAM 2018 COLONOSCOPY 2024 DTaP/Tdap/Td series (2 - Td) 2024 Allergies as of 2018  Review Complete On: 2018 By: Connor Mobley Severity Noted Reaction Type Reactions Dilaudid [Hydromorphone (Bulk)]  2014    Rash, Nausea and Vomiting  
 Sulfa (Sulfonamide Antibiotics)  2014    Rash Vicodin [Hydrocodone-acetaminophen]  2013    Rash Other reaction(s): unknown Current Immunizations  Never Reviewed Name Date Pneumococcal Conjugate (PCV-13) 2016 Tdap 2014 Not reviewed this visit You Were Diagnosed With   
  
 Codes Comments Closed torus fracture of distal end of right fibula with routine healing, subsequent encounter    -  Primary ICD-10-CM: W61.006Y ICD-9-CM: V54.16 Right ankle pain, unspecified chronicity     ICD-10-CM: M25.571 ICD-9-CM: 719.47 Vitals BP Pulse Temp Resp Height(growth percentile) SpO2  
 188/84 (!) 54 98.6 °F (37 °C) (Oral) 18 5' 4\" (1.626 m) 97% OB Status Smoking Status Hysterectomy Current Some Day Smoker Preferred Pharmacy Pharmacy Name Phone [facility] Dominick Arceo [address] [facility] [phone] Your Updated Medication List  
  
   
This list is accurate as of: 1/24/18  2:07 PM.  Always use your most recent med list.  
  
  
  
  
 albuterol 90 mcg/actuation inhaler Commonly known as:  PROVENTIL HFA, VENTOLIN HFA, PROAIR HFA Take 2 Puffs by inhalation every six (6) hours as needed. alcohol swabs Padm Commonly known as:  ALCOHOL PREP PADS Use for daily blood sugar testing to clean skin. NOT FOR INGESTION. alendronate 35 mg tablet Commonly known as:  FOSAMAX TAKE 1 TABLET BY MOUTH EVERY 7 DAYS  
  
 amLODIPine 10 mg tablet Commonly known as:  Larayne Neha Take 1 Tab by mouth daily. atenolol 100 mg tablet Commonly known as:  TENORMIN Take 1 Tab by mouth daily. atorvastatin 20 mg tablet Commonly known as:  LIPITOR Take 1 Tab by mouth daily. carbamide peroxide 6.5 % otic solution Commonly known as:  Garcia Edge Administer 5 Drops into each ear two (2) times a day. cholecalciferol 1,000 unit tablet Commonly known as:  VITAMIN D3 Take 1 Tab by mouth daily. citalopram 20 mg tablet Commonly known as:  Meagan Seen Take 1 Tab by mouth daily. clonazePAM 1 mg tablet Commonly known as:  Kristin Duane Take 1 Tab by mouth daily as needed (anxiety). cyclobenzaprine 10 mg tablet Commonly known as:  FLEXERIL Take 1 Tab by mouth nightly. fluconazole 150 mg tablet Commonly known as:  DIFLUCAN Take 150 mg by mouth daily. fluticasone 50 mcg/actuation nasal spray Commonly known as:  FLONASE  
1 spray each nostril BID  
  
 glipiZIDE 10 mg tablet Commonly known as:  Devries Sickle Take 1 tablet by mouth two (2) times a day. glucose blood VI test strips strip Commonly known as:  blood glucose test  
Use as directed to check blood sugar twice a day. hydroCHLOROthiazide 12.5 mg tablet Commonly known as:  HYDRODIURIL Take 1 Tab by mouth daily. HYDROcodone-acetaminophen 5-325 mg per tablet Commonly known as:  1463 Moi Haney Take 1 Tab by mouth every four (4) hours as needed for Pain. Max Daily Amount: 6 Tabs. JANUVIA 100 mg tablet Generic drug:  SITagliptin TAKE 1 TABLET BY MOUTH DAILY  
  
 lisinopril 10 mg tablet Commonly known as:  PRINIVIL, ZESTRIL  
TAKE 1 TABLET BY MOUTH DAILY  
  
 LYRICA 100 mg capsule Generic drug:  pregabalin TAKE ONE CAPSULE BY MOUTH DAILY  
  
 meloxicam 15 mg tablet Commonly known as:  MOBIC Take 1 tablet by mouth daily. multivitamin tablet Commonly known as:  ONE A DAY Take 1 tablet by mouth daily. nystatin powder Commonly known as:  MYCOSTATIN Apply  to affected area two (2) times a day. oxyCODONE-acetaminophen 7.5-325 mg per tablet Commonly known as:  PERCOCET Take 1 Tab by mouth every eight (8) hours as needed for Pain. Max Daily Amount: 3 Tabs. Indications: Pain  
  
 potassium chloride 20 mEq tablet Commonly known as:  K-DUR, KLOR-CON Take 1 Tab by mouth two (2) times a day. PriLOSEC 40 mg capsule Generic drug:  omeprazole Take 40 mg by mouth daily as needed (GERD). VITAMIN D2 50,000 unit capsule Generic drug:  ergocalciferol Take 50,000 Units by mouth every seven (7) days. We Performed the Following AMB POC XRAY, ANKLE; COMPLETE, 3+ VIE [78730 CPT(R)] AMB SUPPLY ORDER [[id number] Custom] Comments:  
 Tall jose air walker Follow-up Instructions Return in about 3 weeks (around 2/14/2018). Introducing [facility]! [facility] introduces [employer] patient portal. Now you can access parts of your medical record, email your doctor's office, and request medication refills online. 1. In your internet browser, go to [email or web address] 2. Click on the First Time User? Click Here link in the Sign In box.  You will see the New Member Sign Up page. 3. Enter your logolineup Access Code exactly as it appears below. You will not need to use this code after youve completed the sign-up process. If you do not sign up before the expiration date, you must request a new code. · logolineup Access Code: [email or web address] Expires: 3/27/2018  8:21 AM 
 
4. Enter the last four digits of your Social Security Number (xxxx) and Date of Birth (mm/dd/yyyy) as indicated and click Submit. You will be taken to the next sign-up page. 5. Create a logolineup ID. This will be your logolineup login ID and cannot be changed, so think of one that is secure and easy to remember. 6. Create a logolineup password. You can change your password at any time. 7. Enter your Password Reset Question and Answer. This can be used at a later time if you forget your password. 8. Enter your e-mail address. You will receive e-mail notification when new information is available in [address] 9. Click Sign Up. You can now view and download portions of your medical record. 10. Click the Download Summary menu link to download a portable copy of your medical information. If you have questions, please visit the Frequently Asked Questions section of the logolineup website. Remember, logolineup is NOT to be used for urgent needs. For medical emergencies, dial 911. Now available from your iPhone and Android! Please provide this summary of care documentation to your next provider. Your primary care clinician is listed as Riki Mart. If you have any questions after today's visit, please call [phone].

## 2018-01-24 NOTE — PROGRESS NOTES
HISTORY OF PRESENT ILLNESS:  Julia Lora presents to the office for skin check. She is status post, nonoperative distal, minimally-displaced, right fibula fracture. She is at home with a four-post rolling walker, and she is nonweightbearing of the right lower extremity. A cast was placed, which is found to be well-fitting, two weeks ago following her initial encounter with Dr. Rehan Dai. PHYSICAL EXAM:  The cast is removed today to reveal over the dorsum of the right proximal foot and the distal anterior right lower leg, three distinct areas of hyperpigmentation, which were associated with impending fracture blisters identified at her initial encounter. There is no evidence of cellulitis or skin breakdown associated today. The patient has soft tissue swelling circumferentially about the ankle. She has resolved ecchymotic staining of the hindfoot in a U-fashion rounding the calcaneus. There is no evidence of skin breakdown or infection. She has point tenderness over the lateral malleolus. Her internal and external rotation is limited secondary to pain and only 1-2° respectively. Plantarflexion is noted barely at 5° dorsiflexion, actively. She has passive dorsiflexion of about 2° with pain reproduced lateral to the ankle. RADIOGRAPHS:  X-rays, three views of the right ankle, today, without the cast in place, reveals the mortise is acceptably balanced. There is a minimally displaced, right fibular fracture, which, when compared to prior imaging, reveals (+) interval change. PLAN:   A new well-fitting, well-padded, tall fracture walker placed today. The patient is relocating to the [facility]. X-rays were reviewed, and all her questions were answered to her satisfaction. Elevation was also instructed as a preventative to mitigate further skin concerns and associate with pain control as well. Begin weight bearing 25 percent to full with fracture walker on over the next 2 weeks.

## 2018-02-21 ENCOUNTER — TELEPHONE (OUTPATIENT)
Dept: ORTHOPEDIC SURGERY | Facility: CLINIC | Age: 63
End: 2018-02-21